# Patient Record
(demographics unavailable — no encounter records)

---

## 2024-12-23 NOTE — PHYSICAL EXAM
[General Appearance - Well Developed] : well developed [Normal Appearance] : normal appearance [Well Groomed] : well groomed [General Appearance - Well Nourished] : well nourished [No Deformities] : no deformities [General Appearance - In No Acute Distress] : no acute distress [Normal Conjunctiva] : the conjunctiva exhibited no abnormalities [Eyelids - No Xanthelasma] : the eyelids demonstrated no xanthelasmas [Normal Oral Mucosa] : normal oral mucosa [No Oral Pallor] : no oral pallor [No Oral Cyanosis] : no oral cyanosis [Normal Jugular Venous V Waves Present] : normal jugular venous V waves present [No Jugular Venous Chao A Waves] : no jugular venous chao A waves [Respiration, Rhythm And Depth] : normal respiratory rhythm and effort [Exaggerated Use Of Accessory Muscles For Inspiration] : no accessory muscle use [Auscultation Breath Sounds / Voice Sounds] : lungs were clear to auscultation bilaterally [Heart Rate And Rhythm] : heart rate and rhythm were normal [Heart Sounds] : normal S1 and S2 [Murmurs] : no murmurs present [Abdomen Soft] : soft [Abdomen Tenderness] : non-tender [Abdomen Mass (___ Cm)] : no abdominal mass palpated [Abnormal Walk] : normal gait [Gait - Sufficient For Exercise Testing] : the gait was sufficient for exercise testing [Nail Clubbing] : no clubbing of the fingernails [Cyanosis, Localized] : no localized cyanosis [Petechial Hemorrhages (___cm)] : no petechial hemorrhages [Skin Color & Pigmentation] : normal skin color and pigmentation [] : no rash [No Venous Stasis] : no venous stasis [Skin Lesions] : no skin lesions [No Skin Ulcers] : no skin ulcer [No Xanthoma] : no  xanthoma was observed [Oriented To Time, Place, And Person] : oriented to person, place, and time [Affect] : the affect was normal [Mood] : the mood was normal [No Anxiety] : not feeling anxious

## 2025-01-14 NOTE — DISCUSSION/SUMMARY
[FreeTextEntry1] : No further AF feels good so reluctant to change any meds, ie wants to continue amio. TSH and LFTs ok in December.   PM function is normal with adequate safety margins.   f/u 6 months

## 2025-01-14 NOTE — HISTORY OF PRESENT ILLNESS
[de-identified] : Mr. Arreola is an 85-year-old male with a complex past medical history that includes HTN, HLD, AV disease (s/p TAVR), CAD (s/p 3V CABG, PCI), seizure disorder, and atrial arrhythmias.  He is s/p PPM implant for tachy-heraclio syndrome.  On 1/26/2024, he underwent EP testing an ablation, which included pulmonary vein antral isolation, posterior wall isolation, VOM alcohol ablation, lateral mitral isthmus ablation, cavotricuspid isthmus ablation.  At his last visit on 4/2/2024, he did not have recurrence of AF and his amiodarone was decreased to 100mg daily.  However, a remote monitoring transmission from 4/20/2024 showed multiple AMS events, some of which were successfully terminated with atrial ATP.  His amiodarone was subsequently increased back to 200mg daily and he presents today for a follow-up evaluation.    No complaints. NO chest pain. NO shortness of breath. NO palpitations. He feels great. He stopped drinking.

## 2025-01-14 NOTE — PROCEDURE
[Pacemaker] : pacemaker [Lead Imp:  ___ohms] : lead impedance was [unfilled] ohms [Sensing Amplitude ___mv] : sensing amplitude was [unfilled] mv [___V @] : [unfilled] V [___ ms] : [unfilled] ms [de-identified] : [employer]onic [de-identified] : Karen XT [de-identified] : [id number] [de-identified] : 1/22/2021

## 2025-01-27 NOTE — PHYSICAL EXAM
[No Acute Distress] : no acute distress [Well Nourished] : well nourished [Well Developed] : well developed [Well-Appearing] : well-appearing [Normal Sclera/Conjunctiva] : normal sclera/conjunctiva [PERRL] : pupils equal round and reactive to light [EOMI] : extraocular movements intact [Normal Outer Ear/Nose] : the outer ears and nose were normal in appearance [Normal Oropharynx] : the oropharynx was normal [Normal TMs] : both tympanic membranes were normal [No JVD] : no jugular venous distention [No Lymphadenopathy] : no lymphadenopathy [Supple] : supple [Thyroid Normal, No Nodules] : the thyroid was normal and there were no nodules present [No Respiratory Distress] : no respiratory distress  [No Accessory Muscle Use] : no accessory muscle use [Clear to Auscultation] : lungs were clear to auscultation bilaterally [Normal Rate] : normal rate  [Regular Rhythm] : with a regular rhythm [Normal S1, S2] : normal S1 and S2 [No Carotid Bruits] : no carotid bruits [No Abdominal Bruit] : a ~M bruit was not heard ~T in the abdomen [No Varicosities] : no varicosities [Pedal Pulses Present] : the pedal pulses are present [No Edema] : there was no peripheral edema [No Palpable Aorta] : no palpable aorta [No Extremity Clubbing/Cyanosis] : no extremity clubbing/cyanosis [Soft] : abdomen soft [Non Tender] : non-tender [Non-distended] : non-distended [No Masses] : no abdominal mass palpated [No HSM] : no HSM [Normal Bowel Sounds] : normal bowel sounds [No Hernias] : no hernias [Normal Sphincter Tone] : normal sphincter tone [No Mass] : no mass [Penis Abnormality] : normal circumcised penis [Testes Tenderness] : no tenderness of the testes [Testes Mass (___cm)] : there were no testicular masses [Prostate Tenderness] : the prostate was not tender [No Prostate Nodules] : no prostate nodules [Prostate Size ___ (0-4)] : prostate size [unfilled] (scale: 0-4) [Normal Supraclavicular Nodes] : no supraclavicular lymphadenopathy [Normal Axillary Nodes] : no axillary lymphadenopathy [Normal Posterior Cervical Nodes] : no posterior cervical lymphadenopathy [Normal Anterior Cervical Nodes] : no anterior cervical lymphadenopathy [Normal Inguinal Nodes] : no inguinal lymphadenopathy [Normal Femoral Nodes] : no femoral lymphadenopathy [No CVA Tenderness] : no CVA  tenderness [No Spinal Tenderness] : no spinal tenderness [No Joint Swelling] : no joint swelling [Grossly Normal Strength/Tone] : grossly normal strength/tone [Coordination Grossly Intact] : coordination grossly intact [No Focal Deficits] : no focal deficits [Normal Gait] : normal gait [Deep Tendon Reflexes (DTR)] : deep tendon reflexes were 2+ and symmetric [Normal Affect] : the affect was normal [Alert and Oriented x3] : oriented to person, place, and time [Normal Insight/Judgement] : insight and judgment were intact [de-identified] : Prosthetic heart valvew sounds [de-identified] : Pedal pulses diminished [de-identified] : Fungal rash in groin area

## 2025-01-27 NOTE — HISTORY OF PRESENT ILLNESS
[FreeTextEntry1] : Well visit and fu for management of: Afib - on meds ASCAD - on meds Cardiomyopathy - on meds Hypothyroidism - on meds Hyperlipidemia - on meds

## 2025-01-27 NOTE — HEALTH RISK ASSESSMENT
[Good] : ~his/her~  mood as  good [Yes] : Yes [4 or more  times a week (4 pts)] : 4 or more  times a week (4 points) [1 or 2 (0 pts)] : 1 or 2 (0 points) [Never (0 pts)] : Never (0 points) [No] : In the past 12 months have you used drugs other than those required for medical reasons? No [One fall no injury in past year] : Patient reported one fall in the past year without injury [PHQ-2 Negative - No further assessment needed] : PHQ-2 Negative - No further assessment needed [de-identified] : Minimal [de-identified] : Low salt and fat [de-identified] : Bent over in the shower and got dizzy [Former] : Former [20 or more] : 20 or more [> 15 Years] : > 15 Years [NO] : No [Change in mental status noted] : No change in mental status noted [Language] : denies difficulty with language [Behavior] : denies difficulty with behavior [Learning/Retaining New Information] : denies difficulty learning/retaining new information [Handling Complex Tasks] : denies difficulty handling complex tasks [Reasoning] : denies difficulty with reasoning [Spatial Ability and Orientation] : denies difficulty with spatial ability and orientation [None] : None [With Family] : lives with family [Retired] : retired [] :  [Feels Safe at Home] : Feels safe at home [Fully functional (bathing, dressing, toileting, transferring, walking, feeding)] : Fully functional (bathing, dressing, toileting, transferring, walking, feeding) [Fully functional (using the telephone, shopping, preparing meals, housekeeping, doing laundry, using] : Fully functional and needs no help or supervision to perform IADLs (using the telephone, shopping, preparing meals, housekeeping, doing laundry, using transportation, managing medications and managing finances) [Smoke Detector] : smoke detector [Carbon Monoxide Detector] : carbon monoxide detector [Seat Belt] :  uses seat belt [Sunscreen] : uses sunscreen [With Patient/Caregiver] : , with patient/caregiver [Designated Healthcare Proxy] : Designated healthcare proxy [Name: ___] : Health Care Proxy's Name: [unfilled]  [Time Spent: ___ minutes] : Time Spent: [unfilled] minutes [AdvancecareDate] : 01/25 [FreeTextEntry4] : Had long discussion with the patient. Discussed with pt the need for a health care proxy. Discussed who can be a proxy, forms given if needed, and the need for the proxy to know their wishes and to make sure they will comply. The proxy will need to have a copy in their home and the patient should have a copy. Copy is in the house - wishes are known

## 2025-01-27 NOTE — ASSESSMENT
[FreeTextEntry1] : Pt with above medical issues which requires extra work in addition to the well visit. I have advised the patient should consider getting the following vaccinations: Tdap Recent labs reviewed Has a fungal rash - to use antifungal cream or medicated powder daily - then use Baby powder or cornstarch daily To watch diet and start to exercise Health counseling given. FU 6 months

## 2025-01-27 NOTE — REVIEW OF SYSTEMS
[Hearing Loss] : hearing loss [Negative] : Heme/Lymph [FreeTextEntry3] : last optho - 1 year [FreeTextEntry8] : Nocturia x 1 [de-identified] : sees dermatologist

## 2025-04-14 NOTE — ASSESSMENT
[FreeTextEntry1] : Pi is sp hospitalization for pneumonia - and SOB Much better Will continue meds Will fu with cardiology. Can continue Mucinex and Flonase Will fu 6/25  and will order fu CT scan.

## 2025-04-14 NOTE — HISTORY OF PRESENT ILLNESS
[Post-hospitalization from ___ Hospital] : Post-hospitalization from [unfilled] Hospital [Admitted on: ___] : The patient was admitted on [unfilled] [Discharged on ___] : discharged on [unfilled] [Discharge Summary] : discharge summary [Pertinent Labs] : pertinent labs [Radiology Findings] : radiology findings [Discharge Med List] : discharge medication list [Med Reconciliation] : medication reconciliation has been completed [Patient Contacted By: ____] : and contacted by [unfilled] [FreeTextEntry2] : Pt admitted with ? pneumonia and SOB. Had fever. Coughing has improved. Breathing is better Still weak and tired. Treated with antibiotics Eating OK No n,v.d

## 2025-04-14 NOTE — PHYSICAL EXAM
[Normal Sclera/Conjunctiva] : normal sclera/conjunctiva [Normal Oropharynx] : the oropharynx was normal [No JVD] : no jugular venous distention [No Lymphadenopathy] : no lymphadenopathy [Supple] : supple [No Respiratory Distress] : no respiratory distress  [No Accessory Muscle Use] : no accessory muscle use [Normal Rate] : normal rate  [Regular Rhythm] : with a regular rhythm [Normal S1, S2] : normal S1 and S2 [Soft] : abdomen soft [Non Tender] : non-tender [Non-distended] : non-distended [No Masses] : no abdominal mass palpated [No HSM] : no HSM [Normal] : no posterior cervical lymphadenopathy and no anterior cervical lymphadenopathy [Alert and Oriented x3] : oriented to person, place, and time [de-identified] : Few basilar rales on right [de-identified] : trace edema bilaterally

## 2025-04-23 NOTE — PHYSICAL EXAM
[General Appearance - Well Developed] : well developed [Normal Appearance] : normal appearance [Well Groomed] : well groomed [General Appearance - Well Nourished] : well nourished [No Deformities] : no deformities [General Appearance - In No Acute Distress] : no acute distress [Normal Conjunctiva] : the conjunctiva exhibited no abnormalities [Eyelids - No Xanthelasma] : the eyelids demonstrated no xanthelasmas [Normal Oral Mucosa] : normal oral mucosa [No Oral Pallor] : no oral pallor [No Oral Cyanosis] : no oral cyanosis [Normal Jugular Venous V Waves Present] : normal jugular venous V waves present [No Jugular Venous Chao A Waves] : no jugular venous chao A waves [Respiration, Rhythm And Depth] : normal respiratory rhythm and effort [Exaggerated Use Of Accessory Muscles For Inspiration] : no accessory muscle use [Auscultation Breath Sounds / Voice Sounds] : lungs were clear to auscultation bilaterally [Heart Rate And Rhythm] : heart rate and rhythm were normal [Heart Sounds] : normal S1 and S2 [Murmurs] : no murmurs present [Abdomen Soft] : soft [Abdomen Tenderness] : non-tender [Abdomen Mass (___ Cm)] : no abdominal mass palpated [Abnormal Walk] : normal gait [Gait - Sufficient For Exercise Testing] : the gait was sufficient for exercise testing [Nail Clubbing] : no clubbing of the fingernails [Cyanosis, Localized] : no localized cyanosis [Petechial Hemorrhages (___cm)] : no petechial hemorrhages [Skin Color & Pigmentation] : normal skin color and pigmentation [] : no rash [No Venous Stasis] : no venous stasis [Skin Lesions] : no skin lesions [No Skin Ulcers] : no skin ulcer [No Xanthoma] : no  xanthoma was observed [Oriented To Time, Place, And Person] : oriented to person, place, and time [Affect] : the affect was normal [Mood] : the mood was normal [No Anxiety] : not feeling anxious [FreeTextEntry1] : Good spirits

## 2025-04-23 NOTE — DISCUSSION/SUMMARY
[FreeTextEntry1] : Routine labs sent.  If all okay can follow-up in 4 months.  His wife will make an appointment in 4 months as well. [EKG obtained to assist in diagnosis and management of assessed problem(s)] : EKG obtained to assist in diagnosis and management of assessed problem(s)

## 2025-04-23 NOTE — REVIEW OF SYSTEMS
[Cough] : cough [Negative] : Heme/Lymph [Weight Gain (___ Lbs)] : no recent weight gain [Feeling Fatigued] : not feeling fatigued [Weight Loss (___ Lbs)] : no recent weight loss [Dyspnea on exertion] : not dyspnea during exertion [Chest Discomfort] : no chest discomfort [Lower Ext Edema] : lower extremity edema [Leg Claudication] : no intermittent leg claudication [Palpitations] : no palpitations [Dizziness] : no dizziness [FreeTextEntry5] : see HPI [FreeTextEntry6] : Cough is better since went on PPI [de-identified] : No more leg weakness since back in sinus rhythm
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
## 2025-04-23 NOTE — HISTORY OF PRESENT ILLNESS
[FreeTextEntry1] : (MED HX) Patient is only known to me since October of 2010. He had coronary artery bypass surgery in 1990, he had 2 stents in 2006, he had a radiofrequency ablation also in 2006 for SVT (AVNRT) and has remained asymptomatic. He had a cardiac catheterization in August of 2007 which showed a 95% left main, 95% ostial RCA, 90% ramus, 100% LAD but a patent LIMA to the LAD. CECILLE to the second obtuse marginal was occluded. At that time he had a bare-metal stent to the ramus and a Taxus stent to his left main and circumflex   He had episodes of altered mental status in both March and June of 2010 that were thought to be possibly TIA or CVA related and he had a workup that included a LATANYA which was negative except for a question of a bicuspid aortic valve. On January 4, 2011 he saw Dr. Haddad his neurologist who felt he had new symptoms and new lesions an MRI and he had an ulcerated 60% plaque on his left ICA  that at the time was felt to be acute and he was treated with a carotid endarterectomy by Dr. Varghese and did well. In May, 2011 he had upper respiratory infection or pneumonia. His temperature as well was 103 or 100.3. He saw Dr. Damon of pulmonary who felt there was some bronchospasm with the infection but otherwise normal PFTs. However in the past he was told that he only has 75% lung function due to previous smoking history. The patient also has some esophageal reflux. There has been no chest pain and no arrhythmias. No neurologic episodes since his CEA. February 20, 2013  He is here now without complaints. He claims his situation with his swollen hands and his arthritis is stable. He has no complains of chest pain shortness of breath or palpitations. He is on the same medications. He did not have an echo since the last visit. He saw Dr. Florence in March who did PFTs which revealed some small airway disease and some restrictive disease but nothing severe. He had a stress echo on February 26, 2013 that showed mild segmental LV dysfunction with concentric LVH and hypokinetic inferobasal wall (possibly new from 2010) as well as hypokinesis of the intraventricular septum. Only mild valvular disease. Pulmonary artery pressure estimated at 35 mm of mercury. No evidence of ischemia to 8 METS and . June 24, 2013. Here for followup. He was on vacation in [facility] (5!st Anniversary and had had honeymoon in [place]) and a lounge chair collapsed on his finger and cut part of his finger including damaging one of the arteries to his finger. He ended up seeing an orthopedist and had surgery, as the tip of the finger I think was felt to be salvageable. Otherwise he has had no interim complaints or complications since his last visit in February of 2013.  He denies chest pain, shortness of breath, dizziness or palpitations. October 23, 2013. Here for followup. Was told that his left eyelid is compromising his visual field and he should have that operated on which will probably be done sometime in December. He has had a lot of stress and aggravation because of his mother and he has put on more weight. He denies chest pain, shortness of breath, palpitations. His TSH was elevated and his Synthroid was increased to 0.1 mg daily. December 5, 2013. The patient is here for preop evaluation prior to bilateral blepharoplasty. There has been no interval change in his symptoms or history. His Synthroid was increased on his last visit in October. His weight has remained stable. There is no current chest pain or shortness of breath with exertion. His labs were stable and his TSH was now 2.6. He had no complications from the procedure. February 21, 2014. The patient is here for followup. His weight has gone up and he feels more short of breath with walking. His wife says that his last shipment of Synthroid was back to the 0.075 instead of the 0.1 and the patient has been taking that. In addition he saw the ophthalmologist who found a flame hemorrhage near the right optic nerve and dry macular degeneration. He spoke with the patient about losing weight and changing his diet which I did as well. May 15, 2015. The patient is here for followup after not being here since February of last year. Labs in April had a cholesterol of 148, HDL 58, LDL 66. Hemoglobin A1c 5.9. A mild increase in BUN/creatinine. His EKG shows sinus bradycardia at 52. Patient has been doing well with no chest pain but he does get short of breath if he walks fast or goes up hill. At the end of the exam he said to me "I think I need a stress test". June 19, 2015. Patient had an abnormal stress echocardiogram with a drop in blood pressure but nonspecific were suboptimal echo. Nuclear study confirmed ischemia and drop in blood pressure. Cardiac catheterization revealed a tight left main lesion along with obtuse marginal and the distal RCA. The left main was stented given that the LIMA to the LAD was patent, a stent was placed in the obtuse marginal as well, and then after being watched over the weekend a stent was placed in the distal RCA. The patient was discharged and did well and went on a vacation to [facility]. He had no chest pain or shortness of breath and now realizes that he feels much better especially when going up stairs which used to give him significant shortness of breath. In the hospital he was not placed on beta blockers because of severe bradycardia in the past on beta blockers. He was placed on both lisinopril and amlodipine. He has developed a cough which is undoubtedly from the lisinopril. July 16, 2015. The patient is doing well. His cough is gone off the lisinopril and he seems to be tolerating the valsartan 80 mg. He is off amlodipine as well. His blood pressure is well controlled on the low side but no symptoms. He recently celebrated his mother's 100th birthday. March 16, 2016. Patient just getting over losing his mother in February. She was well until 100 and then in August developed mandibular cancer. Underwent surgery and was doing well, but then developed C. difficile and was miserable, stopped eating and drinking, eventually went into hospice and finally passed away last month. The patient is finally over the stress of that period of time. He is still working full-time and staying active. Denies exertional chest pain or shortness of breath. No interval hospitalizations, procedures, change in medication. June 8, 2016. Patient is here for followup and stress echocardiogram. No specific new complaints, but he has not really been doing much walking or exercise. His echo showed mild to moderate MR, mild to moderate AI with moderate AS, mild to moderate TR, RVSP 37 mmHg. There was an inferior wall motion abnormality that was probably new. LVEF is about 49%. There was no evidence of exercise-induced ischemia. His heart rhythm seems to be sinus with very frequent APCs, but not quite atrial fibrillation. At one point during exercise, he developed a rate-related IVCD at a heart rate of 107 and was actually much more regular, all the while having frequent VPCs. He was able to exercise 5-1/2 minutes, which is much more than he thought he would. At 20 seconds of recovery his QRS narrowed back down and at a heart rate of 95 again they were frequent APCs or just significant sinus irregularity. No ischemic changes were noted and as mentioned there was no ischemia on the echo. April 26, 2017. First visit since June of last year. No hospitalizations, or change in medication. He does note shortness of breath with exertion for a number of months now, but upon questioning, it's only if he walks fast or uphill; he can walk long distances on level ground. Does seem worse than a year ago, however. In addition, he will just spontaneously sweat  for no apparent reason, like a woman in menopause. It is mostly only on the top of his head, but it is down. He has no complaints, but he did put on 12 pounds and he gets short of breath when he bends over to tie his shoes. June 7, 2017. Patient came for stress echo. Exercise 4 minutes and 45 seconds to a heart rate of only 114, and had to stop for shortness of breath. No chest pain. Normal blood pressure response. Left bundle branch block developed at heart rate 108 and persisted until a minute and a half of recovery when the heart rate came down to 93. Echo post exercise was visualized with a left bundle branch block so sensitivity diminished. No obvious wall motion abnormalities besides paradoxical septum. Resting echo with moderate AS, valve area 1.1 cm, peak gradient 38, mean 21. Mild-to-moderate AI. Moderate MR. LVEF between 44 and 50%. Normal RV size and function with mild ADAM, and RVSP 42 mm of mercury. Decision made to repeat echo in one year and this time do nuclear stress test in one year. February 20, 2018. Patient is here in followup. Had labs done on the 27th with Dr. Florence, which were acceptable with good lipid profile. No BNP done. (Patient's son now involved with [employer] (Inspect Manager). Patient happy.) June 8, 2018. Patient had echo on June 4 and pharmacologic nuclear study on June 7. I reviewed both studies, compared them with his prior studies, and spoke with the patient today. In truth no significant change. Has small area of reversible ischemia. Has not changed with adequate LVEF. Does have moderate AS and mild to moderate AI. If no change in symptoms, we'll repeat echo in one year, but not sure he needs to continue with another stress test unless chest pain develops. November 7, 2018. Patient saw Dr. Wang recently and had labs and EKG. EKG has a left IVCD and patient referred here. In fact, patient has had rate related left bundle in the recent past and most likely this just reflects ongoing conduction disease without symptoms. Labs were within normal limits except for a TSH of 6.0; patient on Synthroid. Wife complains he sleeps too much and is tired a lot and has shortness of breath. Patient himself not really complaining except for leg pain with walking on the right side, but sounds more orthopedic. Exam unchanged. We'll send BNP and TSH. TSH was borderline elevated. BNP was higher than usual, and patient complaining of shortness of breath, so, Lasix 40 mg added.  November 26, 2018. Patient had followup. Is on Lasix 40 mg daily now. Breathing is definitely better. His complaint is pain in his right leg, which is mostly at rest and positional. Dr. Haddad, of neurology saw him and did an MRI and did not think it was neurologic and suggested an arterial Doppler. Patient asked if he should see an orthopedist. July 17, 2019. Patient has missed appointments because his wife has been sick with diverticulitis a few times. I saw the patient and his wife's hospital room. He has been getting petechiae. Decided to stop the aspirin and just leave him on Plavix. Reviewed the chart in terms of his left main stent but at this point he is stable and does not need DAPT. We'll reassess when he makes an appointment in the next few weeks. July 30, 2019. Patient here in followup. Has been doing very well with no complaints. He thinks the Lasix is great. His left leg swells during the day but is always okay after night. No chest pain. November 26, 2019.  Patient here in follow-up.  Seems very stable on his medical regimen.  Dr. Wang lowered his Lasix from 40-20 because his BUN was 35 with creatinine of 1.1.  Lipids were excellent hemoglobin A1c 5.8 and his BNP was 985.  His EKG remains sinus rhythm with mild ST depressions in V3 through 6 as well as 1 and aVL.  He denies chest pain or shortness of breath.  His weight is slightly down again.  (Long discussion of family issues as his son is now Islam and some issues with his grandchildren etc.). May 19, 2020.  Patient here in follow-up.  Unhappy that Dr. Wang lowered his Lasix to 20 mg and says it does not do anything.  Even claims he has not taken any diuretic in almost a year (even though he lowered it I believe last November).  Complains that he gets very small stream of urine and denies any history of a large prostate with a PSA of 0.15 last November, and that he has some swelling in his left leg.  Has chronic dyspnea on exertion that is unchanged with no exertional chest pain or tightness.  Has lost weight with the social distancing and staying at home from [place] [id number].  Much of his family that lived in [place] were positive but he has stayed away from them.  His EKG remains sinus with very frequent APCs followed by some pauses. August 7, 2020. Patient at Dr. Wang.  Got weak in his legs this morning.  EMS came.  Brought him to the office and he was found to be in atrial fibrillation.  Rate not too bad.  We decided to put him on metoprolol ER 50 mg, add Eliquis or Xarelto, hold Plavix, and he will come see me on Monday, August 10.  August 10, 2020.  Urgent visit for patients who had new onset rapid atrial fibrillation on August 7 as above.  Here with wife.  She says for 2 to 3 days he clearly overdid it partly because of the loss of power and people who know them said he just did not look well.  However Friday morning he went to the shower and could not stand up etc.  After 1 dose of metoprolol he is pretty convinced he went back into sinus rhythm as he felt like a new person.  Here he is in sinus bradycardia at 45 and his EKG is back to baseline.  Excellent blood pressure and he has no symptoms.  Labs will be sent, he will be brought back for a stress echocardiogram and for now will remain on the beta-blocker and the Xarelto September 1, 2020.  Patient here for brief follow-up and stress echocardiogram.  On the stress echo was only able to do 3 minutes and then had a stop but no chest pain.  Has baseline ST depressions that got a little worse.  No VPCs with exercise and just occasional in recovery.  No echocardiographic evidence of ischemia.  Has some resting wall motion abnormalities that seem unchanged from 2 years ago.  Aortic valve area worse than previous echo 2 years ago with valve area by continuity equation only around 1 cm but by dimensionless index seems to be severe.  Also mild to moderate AI, moderate MR, and mild to moderate TR with RVSP 44.  Patient has been more fatigued and limited in what he can do but he thinks it is just because he is out of shape from not exercising and being bored at home during COVID-19.  After long discussion with patient and wife we agreed both to have him increase his activity and see what happens with his symptoms while being evaluated by structural heart for possible TAVR and then will follow up back here in 1 month.  Meanwhile on his beta-blocker he remained in sinus rhythm throughout and has had no symptoms of dizziness syncope or near syncope. September 8, 2020.  Note from Dr. Stephon Correa of structural heart. "Manuel has complex valvular heart disease (AS / AI / MR) with his JAKE calculating at 1.1 on his recent TTE with Dr Tang. I am in agreement with Dr Miller that he be scheduled for a LATANYA to get a better overall sense of his aortic and mitral valve disease, which will further guide our decision making and possible intervention. He also has CAD, remote CABG, and prior stents, and I would also recommend right and left cardiac catheterization, angiography with graft assessment, as CAD progression / ischemic disease may also be the etiology of his recent symptomatic progression and functional decline. Once completed, the imaging will be reviewed by the Heart Team and an optimal treatment strategy recommended. If TAVR is deemed the appropriate next step, he will be scheduled for a cardiac structural CT to complete the evaluation. He is volume up on exam today, and I am recommending he restart 20mg of Lasix daily (he was taking 40mg every other day previously); his Lasix will be titrated accordingly as we move forward. I have answered all of his and his wife's questions in extensive detail, including a discussion of the potential risks and benefits of TAVR. " September 17, 2020.  Left and right heart cath done.  30% left main, 100% proximal LAD stenosis with patent LIMA to the LAD.  30% obtuse marginal.  30% proximal RCA.  Aortic pressure 174.  Capillary wedge pressure 27.  PA pressure 50/20 with a mean of 35.  Cardiac index 2.9.  (Unclear if Angela in follow-up on November 4 Hernández crossed the aortic valve).  LATANYA done showing mild to moderate MR, peak aortic valve gradient 52 with a mean of 24 and a valve area by continuity equation of 0.9.  Mild AI.  Mild LAE and decreased left atrial appendage velocities noted but no thrombus.  Mild global LV systolic dysfunction with LVEF 45 to 50%.  Severe reversible stage III diastolic dysfunction.  RVE with low normal RV systolic function.  Mild to moderate TR with RVSP 59.  Normal pericardium with no effusion. Patient was maintained on 20 mg of Lasix daily, scheduled for CT scan on October 8 and scheduled for T AVR October 28. October 6, 2020.  Patient here in follow-up.  No real change in symptoms energy level fatigue shortness of breath with the Lasix 20 but only minimal edema.  So far negative for COVID but will have to be checked again before the TAVR.  Expresses desire to be sure that after the TAVR he will be feeling better which is likely.  Had a flu shot already. October 29, 2020. TAVR this afternoon. November 19, 2020.  Patient returns in follow-up after TAVR.  Saw Dr. Miller in follow-up on November 4.  Has a Holter monitor in place which does seem to revealed short runs of SVT or atrial fibrillation.  Here in the office he is in sinus rhythm with some grouped beating and APCs.  Overall her ECG is sinus at 67 with APCs.  He saw Dr. Wang 2 days ago.  His BNP is 823, down from 2896.  Hemoglobin 12.3, BUN 30, creatinine 0.85, potassium 4.1.  Normal TFTs.  He and his wife are both in good spirits.  No complaints. December 11, 2020.  Patient here for urgent visit after syncope.  Yesterday saw the structural heart follow-up people.  They reviewed his monitor which had come off about 2 weeks earlier and there were some runs of SVT and NSVT and they recommended he go back on metoprolol but to start with only 25 mg.  Seems there were no issues of bradycardia or at least pauses etc.  Patient left the office yesterday and went home and was walking to the kitchen to get a drink and he passed out broke the glass he was holding and ended up with a fractured clavicle.  He has been fine since and came here to see me.  Here everything on exam and vital signs etc. were within normal limits.  EKG here is sinus rhythm with frequent APCs.  He will be seeing an orthopedist on Monday the 14th.  In the meantime I am placing another monitor on him and allowing him to take the metoprolol as it is just as likely that he had paroxysmal A. fib or SVT with a cardioversion pause as that he had heart block.  If it happens again he will go to the emergency room and need to be admitted for telemetry. January 7, 2020.Finally got the results of his 2-week monitor.  He does have 2 episodes of ventricular tachycardia one lasting 13 beats.  No sustained atrial fibrillation but he is anticoagulated anyway.  Given his complex history of LV dysfunction, coronary disease, recent TAVR and his syncopal episode after his previous monitor was taken off and before this new one was placed I have referred him for EP evaluation with Dr. Moshe Miller.  January 11, 2020.  Emergency visit this morning.  Patient said for the last 2 days and his wife said for 3 extreme fatigue with any exertion such as walking up stairs or standing too long in the shower that he must sit down.  Not having exertional chest pain or real shortness of breath.  Says his shortness of breath is totally gone since the TAVR.  No syncope or near syncope.  Here was in atrial fibrillation with slightly increased heart rate and slightly low blood pressure.  By the end of the exam elected to stop the valsartan and the furosemide, increase the metoprolol ER back to 25 twice daily which in the past has helped convert him back to sinus, hoping it would make him too bradycardic.  He has appointment with Dr. Smith on the 12th. January 12, 2021.  Saw Dr. Miller. "Mr. Arreola is a very pleasant 81 year old man with a complex past medical history that includes AV disease (s/p TAVR), CAD (s/p CABG, PCI) and atrial arrhythmias. He is now demonstrating evidence of sinus node dysfunction, including chronotropic incompetence. He has paroxysmal atrial fibrillation and feels very unwell in arrhythmia. This resting bradycardia does limit therapy for his AF. Just yesterday he was at his PMD's office with AF at 103 bpm. While we may consider nonpharmacologic therapy, eg ablation for his AF, I believe that regardless of the outcome of such a procedure he would require pacing as noted above. He will still require beta blockers for NSVT in the setting of CAD (mild segmental LV dysfunction). We discussed proceeding with PPM to allow for more optimal pharmacotherapy with consideration of more aggressive therapy, ie antiarrhythmic medication or ablation, pending course. " January 23, 2021.  Follow-up status post pacemaker with EPS. "Mr. MANUEL ARREOLA is s/p dual chamber MDT PPM w/Dr. Miller 1/22/20. I was unable to reach him with the number provided but I was able to reach his wife. She reports he was taking a nap, is in mild discomfort but the pain was relieved by tylenol. Denies any bleeding, swelling, oozing, or s/s infection. Post procedure education reinforced. Remote transmission reviewed, no events. F/u appt confirmed for 2/3 @0805." March 22, 2021.  Patient now here in follow-up.  Feels like a million bucks after his pacemaker, probably combination of the TAVR and the pacemaker.  Denies any rapid heartbeats dizziness lightheadedness and has no shortness of breath walking.  Has not resumed metoprolol ER and is still off valsartan.  Reviewed his other medications.  His EKG today is mostly atrial paced with occasional sinus beats with occasional APC.  I elected to resume the metoprolol ER 25 twice daily. June 28, 2021.  Patient here for echo and follow-up.  Feels great.  No symptoms no complaints.  Has mild edema as the day goes on but it is gone in the morning.  Did not notice any change with the furosemide so he stopped it.  All shortness of breath and chest discomfort he had before is gone since his TAVR.  Had okay pacemaker follow-up with Dr. Miller.  Fully vaccinated as is his wife. September 15, 2021.Came for echo today.  Has follow-up with Dr. Correa coming up.  Mostly unchanged from June with segmental LV dysfunction but good EF, TAVR with only trace perivalvular AI, mild to moderate MR.  Normal RV size and function.  RVSP 28.  Ascending aorta and aortic root slightly larger.  Could be from technique.  October 18, 2021.  Patient here in follow-up.  Had follow-up with structural heart on September 23 and they seemed pleased with how well he was doing.  Blood pressure was down to 95 on initial exam here although he was not lightheaded or dizzy but he complains of a chronic fatigue and trouble walking which is hard to pin down but may be more claudication and not related to heart failure or hypotension.  On repeat his blood pressure was 105 which is not far from his baseline.  He remains in sinus rhythm with a first-degree AV block although on exam he was mostly bigeminal. May 16, 2022.  Patient not seen since last October.  Actually here in the waiting room with his wife's appointment decided to see him today.  Just back from a cruise with his wife.  Not really watching his diet according to her.  Did see Dr. Whittington in April; BUN 25 creatinine 0.9 and potassium 5.1 but some hemolysis.  Hemoglobin A1c 6.1.  Last lipids and proBNP back in December.  Last echo in September.  Most recent pacemaker check showed no atrial fibrillation.  EKG today is sinus rhythm with APCs and left IVCD. September 19, 2022.  Patient returns in follow-up and for echocardiogram, together with his wife.  He has been feeling well without any problems.  His last remote pacemaker check was at the end of July; he has not followed up in the office with Dr. Valencia in quite some time but he does not think he has missed any appointments.  There were no episodes of atrial fibrillation on that interrogation.  He is in sinus rhythm with first-degree AV block here and he does have APCs during the echo and on exam.  His echo was for the most part unchanged with LVEF around 50% with segmental wall motion abnormalities as before inferolaterally.  There is mild to moderate MR only minimal AI with his TAVR and moderate TR with his RVSP 39 which is a little higher than last time.  He himself has no complaints.  They have not yet had the new COVID-vaccine. September 21, 2022.  Reviewed labs with patient.  All acceptable except proBNP did go up a little and LV function was a little worse on echo.  Continue current medications and reevaluate next visit. November 15, 2022.  Patient here for urgent follow-up after brief hospitalization and cardioversion.  Story not perfect going back and forth between patient and his wife.  On October 31 patient fell and hit his head and also felt he was getting more short of breath. (Needed 2 staples to scalp. ER said he was in a fib). It seems every time he walked even 10 feet he was getting out of breath.  The pacemaker monitoring people called on Monday to say that he was in atrial fibrillation at least since Sunday (actually at least since November 6.).  (Difficult to say which came first).  He called Dr. Miller's office and they gave him an elective appointment for a LATANYA cardioversion on November 16 because they were concerned that he said he might occasionally have missed a dose of Xarelto.  However before he could have the elective procedure he got more short of breath and went to the emergency room on Thursday the 10th and was treated with IV Lasix and then on the morning of the 11th he had a LATANYA cardioversion and after 1 shock was back to normal rhythm.  He was discharged home on the same medications except instead of being on Lasix 20 q. OD he is now on 40 daily.  He seems to be doing much better and probably back to baseline but his baseline does include some limitation from claudication for which he had seen Dr. Vickers in the past and he did discuss possible angioplasty and stent in his leg.  By the end of the visit I started him on Jardiance 10 mg and we will consider changing his valsartan to Entresto if the [facility] will [facility] the cost.  He will increase his ambulating as far as his claudication goes and depending on the response might make a follow-up with vascular.  Currently he is in sinus rhythm with a first-degree AV block. December 12, 2022.  Patient returns in follow-up.  He definitely feels better on the Jardiance and on his own stopped the furosemide 20 mg because he was going to the bathroom too much.  Good spirits and no complaints other than the claudication although he admits its mostly when he walks uphill and if he walks level he can walk a long time.  However he complains that the alternative to the stent was to do more walking and he has trouble doing that.  His EKG remains sinus rhythm with a first-degree AV block.  Ventricular rate is 81.  Has a follow-up appointment with Dr. Miller of [facility] on December 20. Reviewed labs with patient. BNP slightly less.  Kidney numbers slightly improved.  Probably would do better changing valsartan to Entresto and changing Xarelto to Eliquis but they can both wait until his January visit.    December 20, 2022.  He saw Dr. Smith.  Still in sinus rhythm, no chest pain or shortness of breath but mild edema.  He increased the metoprolol ER to 50 in the morning and 25 at night. December 23, 2022 had lower extremity Doppler and discussed with Dr. Vickers.  Continuing medical therapy.  Had follow-up appointment with him on January 13 and again denied any symptoms on level ground.  The plan was to continue daily walking and daily foot checks and return in 1 year. January 30, 2023.  Patient returns here in follow-up together with his wife.  His only complaint is some edema on the left leg with none on the right which he thinks is new but probably is not.  He is more worried about his wife who was having abdominal pain and had a CAT scan and they were having trouble getting the results etc. February 22, 2023.  Patient got the [facility] to approve his Entresto and we changed from valsartan to Entresto on February 24. June 5, 2023.  First visit since January 30.  He did see Dr. Miller once in follow-up on March 21.  He had dermatologic surgery complicated by some bleeding issues treated on April 24 with injections of here today his blood pressure is excellent, his weight is stable, his EKG is sinus rhythm with a marked first-degree AV block and a left IVCD with left anterior hemiblock and poor R wave progression.  Mostly unchanged, pacemaker interrogation March 21 was okay.  He seems to be on Entresto 49/51, Jardiance 25, metoprolol ER 25 twice daily, atorvastatin 80, aspirin 81, and Synthroid 0.1.  He remains on Xarelto 20 daily.  He recently turned 84 and he and his wife will be going into the city to see some shows staying in a hotel etc.  He feels wonderful without complaints probably related to his current heart failure regimen.  He had follow-up with pulmonary, Dr. Alegria, and had a chest CT a couple of days ago that they are waiting on the results. June 22, 2023.  On June 13 the PA for Dr. Miller tasked me that the patient's transmission showed persistent AF since Grace 7 which is when he started having symptoms again (when he goes into atrial fibrillation he always feels "weak in his legs").  Because he had previously gone into heart failure with A fb, Dr. Miller arranged to have the patient admitted for monitoring to load with sotalol and then cardioversion if necessary.  When he got to the hospital he was atrially paced rhythm (he said today that the pacemaker he has can paced him out of the atrial fibrillation.)  I saw him in the hospital and he was doing fine and he was able to go home after about 2-1/2 days of monitoring.  He saw Dr. Miller on June 20 and his initial blood pressure was 163/102 and then came down to 146/87 so he wanted the patient to be seen by me.  Here the patient's blood pressure was 122/79.  In addition I had mentioned to them in the past that the patient's chronic cough could be reflux so his wife gave him a PPI and his cough seems to have resolved.  Here he is in sinus rhythm with a first-degree AV block and around 85 with a mild IVCD with acceptable QRS and QT. October 18, 2023.  Now here with his wife.  He did have a follow-up with Dr. Miller of [facility] and he seems to finally understand that it is his sotalol that is keeping him out of A-fib and his pacemaker is adjusted to prevent bradycardia etc.  He claimed he had no A-fib and has been asymptomatic but when I reminded him that his usual symptoms of A-fib when his legs feel funny and he thinks he might fall he remembers he had an episode like that a week ago while shopping with his wife in the mall a told her he just had a sit down for a while.  After she was finished about 1/2-hour later he was able to get up and continue with her and felt perfectly fine.  No complaints of exertional chest pain or shortness of breath.  Only minimal pitting edema. January 2, 2024. As per note from Dr. Miller's note, patient scheduled for cardioversion now that he is loaded with amiodarone and then ablation procedure. January 18, 2024. Patient called me today that he is very uncomfortable back in atrial fibrillation and looking to have the ablation moved up from March. I spoke with Dr. Miller who will try to move him up to next Friday or the Friday afterwards. In the meantime I told the patient if he is uncomfortable in the fibrillation he could take metoprolol together with his amiodarone and see if that helps as it has in the past.--Was scheduled for January 26, 2024.  "Patient was admitted to [facility] on 1/26/2024.   Patient was discharged to home on 1/27/2024.   Discharge diagnosis: Chronic Atrial Fibrillation.   Spoke with patient.   Hospital Course: Spoke with patient regarding recent hospitalization on 1/26/2024. Patient was admitted for chronic atrial fibrillation where he underwent an ablation. Patient reports he is doing "fantastic" at this time and has no complaints at this time. Patient was discharged on amiodarone 200 mg daily and is taking medication as prescribed. Patient will be following up with Dr. Whittington 2/26/2024. Patient advised to call office if he had any questions or concerns. ER precautions given. "  February 26, 2024.  Patient returns in follow-up.  He had seen Dr. Anna on January 22 prior to the ablation.  Labs at that time were okay but just included a CBC and chemistries, no lipids, no proBNP, no hemoglobin A1c.  Patient returns here now together with his wife.  On his EKG his ventricular rate is regular at 69 and it might be sinus with a first-degree AV block and less likely slow atrial flutte  His weight is stable although his blood pressure is on the low side but acceptable. He is supposed to see Dr. Whittington later today and he will be seeing Dr. Alejandre of [facility] in April.

## 2025-04-23 NOTE — REASON FOR VISIT
[FreeTextEntry1] : 85 year old man status post CABG, stents, carotid endarterectomy, RFA for SVT, now s/p stents to protected left main, OM2, and distal RCA, here for followup now on lasix 20 qd, with new atrial fib Now s/p TAVR. Now s/p DDD PM. Now here after recent CHF and atrial fibrillation, status post LATANYA cardioversion on November 11, 2022. Returns after brief admission to be loaded with sotalol now found to be hypertensive at EPS follow-up visit. Now here after loading with amiodarone and undergoing atrial fibrillation ablation January 26, 2024 again with Dr. Miller.   (CONTINUE PROGRESS NOTES HERE). Patient returns in follow-up. Most recent A-fib ablation was January 26, 2024. No episodes of palpitation or in his case no episodes of legs being weak and giving out on him which were the symptoms with his atrial fibrillation a number of times in the past. Remote monitoring transmission from April 20 showed multiple AMS events some of which were successfully terminated with atrial antitachycardia pacing.  His amiodarone which had been 100 mg was increased back to 200 mg. He saw Dr. Milelr of [facility] a few weeks ago including an EKG that was sinus rhythm and unchanged.  His systolic blood pressure is borderline low here but he has absolutely no symptoms of syncope or near syncope.  Complains somewhat of some fatigue but he says that it has been for 50 years.  He denies any bleeding issues and no dark black stool and he has not been out in the heat, mostly in the air conditioning room but certainly possible that there is a little role of volume depletion. August 26, 2024.  Patient returns in follow-up together with his wife.  Back in April monitoring showed that he was having many episodes of atrial tach or atrial fibrillation with antitachycardia termination when his amiodarone was lowered from 200 to 100 mg.  Dr. Miller put it back to 200 and since then he has not had any atrial fibrillation as far as we know.  Meanwhile on his own because he was doing so well and because he had stopped drinking vodka he decided to stop his amiodarone and he did not even tell his wife until today in the exam room when he told me with her sitting there.  He has been off of it for about a month.  We discussed in great detail the importance of it and how likely the atrial fibrillation would come back and he agreed to go back on the 200 mg a day.  Other than that he is feeling well with no issues, no chest pain shortness of breath dizziness.  His EKG is sinus rhythm at 62 with left anterior hemiblock, IVCD, poor R wave progression and unchanged. December 23, 2024.  Patient returns in follow-up together with his wife.  He has good spirits no complaints although his wife mentions he had at least 2 episodes of dizziness and near syncope; patient says it was only 1 and it was not even that bad as near syncope.  His most recent pacemaker interrogation in the chart is from October and these events post date that.  At that time there was one 4 beat NSVT.  No episodes of atrial arrhythmias that needed terminating.  He has an upcoming appointment with Dr. Miller in January.  Otherwise no exertional chest pain or shortness of breath but patient tends to play down all his symptoms.  At this point he is compliant with his amiodarone 200 daily. April 8, 2025.  Spoke with wife and got a call from Dr. Schrader the hospitalist that the patient is in [facility] with pneumonia. They were asking whether he is on furosemide and he has not been on that for quite a while probably since he was on Jardiance instead. Issues with pedal edema and whether or not there is some fluid in his lungs. I did advise Dr. Medeiros if he feels the need he could call house cardiology, ideally Dr. Kong. Had low-grade fever and chest x-ray and CT both seem to suggest pneumonia. Off Jardiance in the hospital so probably will need some Lasix.  Was finally discharged just a day later April 23, 2025.  Patient returns in follow-up.  He remains in sinus rhythm with a marked first-degree AV block of 0.328, left IVCD with left anterior hemiblock and poor R wave progression; mostly unchanged from December of last year.  Here blood pressure is excellent and weight is actually down a couple of pounds. He finished the antibiotics that they gave him when he left the hospital for his pneumonia.  .  He is somewhat reluctant to get a follow-up CT that Dr. Whittington recommended.

## 2025-05-09 NOTE — PHYSICAL EXAM
[Alert] : alert [Oriented x 3] : ~L oriented x 3 [Well Nourished] : well nourished [Conjunctiva Non-injected] : conjunctiva non-injected [No Visual Lymphadenopathy] : no visual  lymphadenopathy [No Clubbing] : no clubbing [No Edema] : no edema [No Bromhidrosis] : no bromhidrosis [No Chromhidrosis] : no chromhidrosis [Full Body Skin Exam Performed] : performed [FreeTextEntry3] : 7 mm x 8 mm light pink thin papule on the left superior lower cheek with central thick yellow scaly papules (see photo, 05/09/2025)  Scars on the scalp without pigmentation or nodularity  Scattered, poorly circumscribed red erythematous, rough papules on the right frontal scalp, vertex, left superior forehead  Many tan macules and patches with moth-eaten borders on the face, trunk, and extremities  Several scattered, red, partially blanching papules on the trunk and extremities.  Brown and pink verrucous papules on the trunk  Brown, hyperpigmented smooth patches on b/l lower extremities with pitting edema 2+ and varicosities

## 2025-05-09 NOTE — HISTORY OF PRESENT ILLNESS
[FreeTextEntry1] : rpa: growth on the left cheek [de-identified] : 85-year-old male h/o Afib s/p TAVR, CAD s/p CABG last seen 9/2024, presenting today for evaluation of   # Rough spot on left cheek x mos. Growing, asymptomatic. Shaving does not remove it.  #FBSE. No other new, evolving, or symptomatic lesions.   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  No FH skin cancer. Social hx: here with wife

## 2025-05-09 NOTE — HISTORY OF PRESENT ILLNESS
[FreeTextEntry1] : rpa: growth on the left cheek [de-identified] : 85-year-old male h/o Afib s/p TAVR, CAD s/p CABG last seen 9/2024, presenting today for evaluation of   # Rough spot on left cheek x mos. Growing, asymptomatic. Shaving does not remove it.  #FBSE. No other new, evolving, or symptomatic lesions.   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  No FH skin cancer. Social hx: here with wife

## 2025-05-09 NOTE — ASSESSMENT
[FreeTextEntry1] : 1. Neoplasm of unknown biological significance, on the left superior lower cheek - Suspicious for HAK vs. ISK, r/o NMSC - Biopsy was performed today for histologic correlation. Will contact Patient with results and plan treatment considering histologic findings. Shave Biopsy: All side effects including pain, bleeding, infection, scar, recurrence, nerve damage were discussed and consent was obtained. We anesthetized the area with 1% lidocaine/epinephrine. The lesion was biopsied with a Dermablade. Hemostasis was achieved with Drysol. Wound care were provided and the tissue was submitted to pathology for evaluation.  2. Actinic Keratoses x7, scalp, forehead - Prev recommended Efudex (5-Fluorouracil) cream BID x 2 weeks to the scalp however pt defers. He prefers cryotherapy to field therapy or PDT.  - Patient was verbally consented and the lesions identified above were treated with liquid nitrogen freeze, thaw, freeze x 10 seconds each cycle x 2. Side effects include blister formation, hypopigmentation, and scarring. The patient tolerated the procedure well. Wound care instructions, care of a blister with vaseline, signs and symptoms of infection were discussed in full. The patient denies any questions at this time. - RTC 6 wks to f/u with attending to ensure resolution.   3. Hx NMSC - BCC and SCC on scalp and forehead s/p Mohs, most recent SCCIS of the L frontal scalp s/p Mohs (4/2023) - No evidence of recurrence.  - Continue to monitor.  4. Lentigines 5. Seborrheic keratoses 6. Full body exam today. Findings as above. - Patient educated on ABCDEs of melanoma screening - Recommend self-skin exam monthly - Photoprotection reviewed including sun-protective behaviors, protective clothing, and the use of OTC broad-spectrum SPF 30+ sunscreens was advised - RTC if develops lesions that are new, symptomatic (bleeding, itching), changing in size/color/shape.  RTC pending bx results.  RTC 6 wks to f/u for AKs with attending.

## 2025-07-30 NOTE — PROCEDURE
[Pacemaker] : pacemaker [Lead Imp:  ___ohms] : lead impedance was [unfilled] ohms [Sensing Amplitude ___mv] : sensing amplitude was [unfilled] mv [___V @] : [unfilled] V [___ ms] : [unfilled] ms [de-identified] : Medtronic [de-identified] : Karen XT [de-identified] : [id number] [de-identified] : 1/22/2021

## 2025-07-30 NOTE — PROCEDURE
[Pacemaker] : pacemaker [Lead Imp:  ___ohms] : lead impedance was [unfilled] ohms [Sensing Amplitude ___mv] : sensing amplitude was [unfilled] mv [___V @] : [unfilled] V [___ ms] : [unfilled] ms [de-identified] : Medtronic [de-identified] : Karen XT [de-identified] : [id number] [de-identified] : 1/22/2021

## 2025-07-30 NOTE — HISTORY OF PRESENT ILLNESS
[de-identified] : Mr. Arreola is an 86-year-old male with a complex past medical history that includes HTN, HLD, AV disease (s/p TAVR), CAD (s/p 3V CABG, PCI), seizure disorder, and atrial arrhythmias.  He is s/p PPM implant for tachy-heraclio syndrome.  On 1/26/2024, he underwent EP testing an ablation, which included pulmonary vein antral isolation, posterior wall isolation, VOM alcohol ablation, lateral mitral isthmus ablation, cavotricuspid isthmus ablation.  At his last visit on 4/2/2024, he did not have recurrence of AF and his amiodarone was decreased to 100mg daily.  However, a remote monitoring transmission from 4/20/2024 showed multiple AMS events, some of which were successfully terminated with atrial ATP.  His amiodarone was subsequently increased back to 200mg daily and he presents today for a follow-up evaluation.    No complaints. NO chest pain. NO shortness of breath. NO palpitations. He feels great. He stopped drinking.

## 2025-07-30 NOTE — CARDIOLOGY SUMMARY
[___] : [unfilled] [___] : [unfilled] [TextEntry] : EP: 1/26/2024 pulmonary vein antral isolation, posterior wall isolation, VOM alcohol ablation, lateral mitral isthmus ablation, cavotricuspid isthmus ablation, there was diffusely low voltage

## 2025-07-30 NOTE — HISTORY OF PRESENT ILLNESS
[de-identified] : Mr. Arreola is an 86-year-old male with a complex past medical history that includes HTN, HLD, AV disease (s/p TAVR), CAD (s/p 3V CABG, PCI), seizure disorder, and atrial arrhythmias.  He is s/p PPM implant for tachy-heraclio syndrome.  On 1/26/2024, he underwent EP testing an ablation, which included pulmonary vein antral isolation, posterior wall isolation, VOM alcohol ablation, lateral mitral isthmus ablation, cavotricuspid isthmus ablation.  At his last visit on 4/2/2024, he did not have recurrence of AF and his amiodarone was decreased to 100mg daily.  However, a remote monitoring transmission from 4/20/2024 showed multiple AMS events, some of which were successfully terminated with atrial ATP.  His amiodarone was subsequently increased back to 200mg daily and he presents today for a follow-up evaluation.    No complaints. NO chest pain. NO shortness of breath. NO palpitations. He feels great. He stopped drinking.

## 2025-07-30 NOTE — DISCUSSION/SUMMARY
[FreeTextEntry1] : No further AF  will lower the amio to 100 mg daily.    PM function is normal with adequate safety margins.   f/u 6 months